# Patient Record
Sex: FEMALE | Race: WHITE | NOT HISPANIC OR LATINO | Employment: OTHER | URBAN - METROPOLITAN AREA
[De-identification: names, ages, dates, MRNs, and addresses within clinical notes are randomized per-mention and may not be internally consistent; named-entity substitution may affect disease eponyms.]

---

## 2017-02-06 ENCOUNTER — TELEPHONE (OUTPATIENT)
Dept: UROLOGY | Facility: CLINIC | Age: 72
End: 2017-02-06

## 2017-02-06 NOTE — TELEPHONE ENCOUNTER
----- Message from Cindy Barrientos sent at 2/6/2017  8:22 AM CST -----  Contact: Annette,[facility]  Annette wants to speak with a nurse regarding patient supply please call back at [phone]

## 2017-02-28 ENCOUNTER — ANTI-COAG VISIT (OUTPATIENT)
Dept: CARDIOLOGY | Facility: CLINIC | Age: 72
End: 2017-02-28

## 2017-02-28 DIAGNOSIS — I48.91 ATRIAL FIBRILLATION, UNSPECIFIED TYPE: ICD-10-CM

## 2017-02-28 DIAGNOSIS — Z79.01 LONG TERM (CURRENT) USE OF ANTICOAGULANTS: ICD-10-CM

## 2017-03-02 ENCOUNTER — ANTI-COAG VISIT (OUTPATIENT)
Dept: CARDIOLOGY | Facility: CLINIC | Age: 72
End: 2017-03-02

## 2017-03-02 DIAGNOSIS — Z79.01 LONG TERM (CURRENT) USE OF ANTICOAGULANTS: Primary | ICD-10-CM

## 2017-03-02 DIAGNOSIS — I48.91 ATRIAL FIBRILLATION, UNSPECIFIED TYPE: ICD-10-CM

## 2017-03-02 LAB — INR PPP: 2

## 2017-03-03 RX ORDER — WARFARIN 2.5 MG/1
TABLET ORAL
Qty: 30 TABLET | Refills: 3 | Status: SHIPPED | OUTPATIENT
Start: 2017-03-03 | End: 2017-03-30 | Stop reason: SDUPTHER

## 2017-03-03 NOTE — PROGRESS NOTES
Orders sent to [facility] and to . helena states a refill on pts coumadin needs to be sent to the medicine cabinet in [place] fax #   [phone]

## 2017-03-09 ENCOUNTER — ANTI-COAG VISIT (OUTPATIENT)
Dept: CARDIOLOGY | Facility: CLINIC | Age: 72
End: 2017-03-09

## 2017-03-09 DIAGNOSIS — Z79.01 LONG TERM (CURRENT) USE OF ANTICOAGULANTS: ICD-10-CM

## 2017-03-09 LAB — INR PPP: 1.4

## 2017-03-20 ENCOUNTER — ANTI-COAG VISIT (OUTPATIENT)
Dept: CARDIOLOGY | Facility: CLINIC | Age: 72
End: 2017-03-20

## 2017-03-20 DIAGNOSIS — Z79.01 LONG TERM (CURRENT) USE OF ANTICOAGULANTS: ICD-10-CM

## 2017-03-20 LAB — INR PPP: 1.3

## 2017-03-27 ENCOUNTER — ANTI-COAG VISIT (OUTPATIENT)
Dept: CARDIOLOGY | Facility: CLINIC | Age: 72
End: 2017-03-27

## 2017-03-27 DIAGNOSIS — Z79.01 LONG TERM (CURRENT) USE OF ANTICOAGULANTS: ICD-10-CM

## 2017-03-27 LAB — INR PPP: 1.2

## 2017-03-29 ENCOUNTER — ANTI-COAG VISIT (OUTPATIENT)
Dept: CARDIOLOGY | Facility: CLINIC | Age: 72
End: 2017-03-29

## 2017-03-29 DIAGNOSIS — Z79.01 LONG TERM (CURRENT) USE OF ANTICOAGULANTS: ICD-10-CM

## 2017-03-29 LAB — INR PPP: 1.9

## 2017-03-30 RX ORDER — WARFARIN 2.5 MG/1
2.5-5 TABLET ORAL DAILY
Qty: 40 TABLET | Refills: 5 | Status: SHIPPED | OUTPATIENT
Start: 2017-03-30 | End: 2017-05-18 | Stop reason: SDUPTHER

## 2017-04-03 LAB — INR PPP: 1.9

## 2017-04-04 ENCOUNTER — ANTI-COAG VISIT (OUTPATIENT)
Dept: CARDIOLOGY | Facility: CLINIC | Age: 72
End: 2017-04-04

## 2017-04-04 DIAGNOSIS — Z79.01 LONG TERM (CURRENT) USE OF ANTICOAGULANTS: ICD-10-CM

## 2017-04-11 ENCOUNTER — ANTI-COAG VISIT (OUTPATIENT)
Dept: CARDIOLOGY | Facility: CLINIC | Age: 72
End: 2017-04-11

## 2017-04-11 DIAGNOSIS — Z79.01 LONG TERM (CURRENT) USE OF ANTICOAGULANTS: ICD-10-CM

## 2017-04-11 LAB — INR PPP: 1.8

## 2017-04-19 ENCOUNTER — ANTI-COAG VISIT (OUTPATIENT)
Dept: CARDIOLOGY | Facility: CLINIC | Age: 72
End: 2017-04-19

## 2017-04-19 DIAGNOSIS — Z79.01 LONG TERM (CURRENT) USE OF ANTICOAGULANTS: ICD-10-CM

## 2017-04-19 LAB — INR PPP: 1.8

## 2017-04-27 DIAGNOSIS — Z95.0 PACEMAKER: Primary | ICD-10-CM

## 2017-04-27 DIAGNOSIS — I48.91 ATRIAL FIBRILLATION, UNSPECIFIED TYPE: ICD-10-CM

## 2017-05-02 ENCOUNTER — ANTI-COAG VISIT (OUTPATIENT)
Dept: CARDIOLOGY | Facility: CLINIC | Age: 72
End: 2017-05-02

## 2017-05-02 DIAGNOSIS — Z79.01 LONG TERM (CURRENT) USE OF ANTICOAGULANTS: ICD-10-CM

## 2017-05-02 LAB — INR PPP: 2.6

## 2017-05-16 ENCOUNTER — ANTI-COAG VISIT (OUTPATIENT)
Dept: CARDIOLOGY | Facility: CLINIC | Age: 72
End: 2017-05-16

## 2017-05-16 DIAGNOSIS — Z79.01 LONG TERM (CURRENT) USE OF ANTICOAGULANTS: ICD-10-CM

## 2017-05-16 LAB — INR PPP: 2.6

## 2017-05-18 DIAGNOSIS — Z79.01 LONG TERM (CURRENT) USE OF ANTICOAGULANTS: ICD-10-CM

## 2017-05-18 RX ORDER — WARFARIN 2.5 MG/1
2.5-5 TABLET ORAL DAILY
Qty: 45 TABLET | Refills: 6 | Status: ON HOLD | OUTPATIENT
Start: 2017-05-18 | End: 2017-10-02 | Stop reason: HOSPADM

## 2017-05-18 NOTE — PROGRESS NOTES
Pt needs refill sent to the medicine cabinet, Dr Romero has already pended it, but his MA brought it to us to refill

## 2017-06-06 ENCOUNTER — ANTI-COAG VISIT (OUTPATIENT)
Dept: CARDIOLOGY | Facility: CLINIC | Age: 72
End: 2017-06-06

## 2017-06-06 LAB — INR PPP: 2

## 2017-06-27 ENCOUNTER — ANTI-COAG VISIT (OUTPATIENT)
Dept: CARDIOLOGY | Facility: CLINIC | Age: 72
End: 2017-06-27

## 2017-06-27 DIAGNOSIS — Z79.01 LONG TERM (CURRENT) USE OF ANTICOAGULANTS: ICD-10-CM

## 2017-06-27 LAB — INR PPP: 3.1

## 2017-07-10 ENCOUNTER — TELEPHONE (OUTPATIENT)
Dept: CARDIOLOGY | Facility: CLINIC | Age: 72
End: 2017-07-10

## 2017-07-10 NOTE — TELEPHONE ENCOUNTER
----- Message from Rani Guillen sent at 7/10/2017 10:26 AM CDT -----  Contact: Patient  Patient is having defibrillations and has since this past weekend. Please call patient [phone] for an appointment and advice.

## 2017-07-12 ENCOUNTER — TELEPHONE (OUTPATIENT)
Dept: CARDIOLOGY | Facility: CLINIC | Age: 72
End: 2017-07-12

## 2017-07-12 NOTE — TELEPHONE ENCOUNTER
----- Message from Melyssa Montenegro sent at 7/12/2017  2:47 PM CDT -----  Contact: Patient   Patient states that she had to re-schedule the appointment she missed and it's for August 7tt.  The patient has questions about the battery in her pacemaker.  Can you please call the patient back at [phone].  Thank you

## 2017-07-18 ENCOUNTER — ANTI-COAG VISIT (OUTPATIENT)
Dept: CARDIOLOGY | Facility: CLINIC | Age: 72
End: 2017-07-18

## 2017-07-18 LAB — INR PPP: 4.2

## 2017-07-19 RX ORDER — ISOSORBIDE MONONITRATE 30 MG/1
30 TABLET, EXTENDED RELEASE ORAL DAILY
Refills: 5 | COMMUNITY
Start: 2017-06-19

## 2017-07-19 RX ORDER — TRAMADOL HYDROCHLORIDE 50 MG/1
TABLET ORAL
Refills: 1 | COMMUNITY
Start: 2017-05-30 | End: 2017-09-26

## 2017-07-19 RX ORDER — DICLOFENAC SODIUM 10 MG/G
2-4 GEL TOPICAL
Refills: 5 | COMMUNITY
Start: 2017-06-19 | End: 2017-09-26

## 2017-07-19 RX ORDER — TRAZODONE HYDROCHLORIDE 100 MG/1
100 TABLET ORAL NIGHTLY
Refills: 3 | COMMUNITY
Start: 2017-06-19

## 2017-07-19 RX ORDER — METFORMIN HYDROCHLORIDE 1000 MG/1
1000 TABLET ORAL 2 TIMES DAILY WITH MEALS
Refills: 3 | COMMUNITY
Start: 2017-06-19 | End: 2017-09-26

## 2017-07-19 RX ORDER — GABAPENTIN 300 MG/1
300 CAPSULE ORAL 3 TIMES DAILY
Refills: 3 | COMMUNITY
Start: 2017-06-19 | End: 2017-10-11

## 2017-07-25 ENCOUNTER — ANTI-COAG VISIT (OUTPATIENT)
Dept: CARDIOLOGY | Facility: CLINIC | Age: 72
End: 2017-07-25

## 2017-07-25 DIAGNOSIS — Z79.01 LONG TERM (CURRENT) USE OF ANTICOAGULANTS: ICD-10-CM

## 2017-07-25 LAB — INR PPP: 5.3

## 2017-07-27 ENCOUNTER — ANTI-COAG VISIT (OUTPATIENT)
Dept: CARDIOLOGY | Facility: CLINIC | Age: 72
End: 2017-07-27

## 2017-07-27 DIAGNOSIS — Z79.01 LONG TERM (CURRENT) USE OF ANTICOAGULANTS: ICD-10-CM

## 2017-07-27 LAB — INR PPP: 3.1

## 2017-08-02 ENCOUNTER — ANTI-COAG VISIT (OUTPATIENT)
Dept: CARDIOLOGY | Facility: CLINIC | Age: 72
End: 2017-08-02

## 2017-08-02 DIAGNOSIS — Z79.01 LONG TERM (CURRENT) USE OF ANTICOAGULANTS: ICD-10-CM

## 2017-08-02 LAB — INR PPP: 3.3

## 2017-08-02 NOTE — PROGRESS NOTES
Faxed to [facility] and [facility] dose also called to [facility].

## 2017-08-10 ENCOUNTER — TELEPHONE (OUTPATIENT)
Dept: HEMATOLOGY/ONCOLOGY | Facility: CLINIC | Age: 72
End: 2017-08-10

## 2017-08-10 ENCOUNTER — ANTI-COAG VISIT (OUTPATIENT)
Dept: CARDIOLOGY | Facility: CLINIC | Age: 72
End: 2017-08-10

## 2017-08-10 DIAGNOSIS — Z79.01 LONG TERM (CURRENT) USE OF ANTICOAGULANTS: ICD-10-CM

## 2017-08-10 LAB — INR PPP: 1.8

## 2017-08-10 NOTE — TELEPHONE ENCOUNTER
----- Message from Edith Muller RT sent at 8/10/2017  1:11 PM CDT -----  Contact: Sofia , Daniel Black , Daniel Manzanares, requesting to inform effective as of yesterday they will no longer be taking care of her medications, please send information to misbah, since the pt is moving, thanks.

## 2017-08-17 ENCOUNTER — ANTI-COAG VISIT (OUTPATIENT)
Dept: CARDIOLOGY | Facility: CLINIC | Age: 72
End: 2017-08-17

## 2017-08-17 ENCOUNTER — TELEPHONE (OUTPATIENT)
Dept: CARDIOLOGY | Facility: CLINIC | Age: 72
End: 2017-08-17

## 2017-08-17 DIAGNOSIS — Z79.01 LONG TERM (CURRENT) USE OF ANTICOAGULANTS: ICD-10-CM

## 2017-08-17 LAB — INR PPP: 1.3 (ref 2–3)

## 2017-08-17 NOTE — TELEPHONE ENCOUNTER
----- Message from Celina Singh PharmD sent at 8/17/2017  3:14 PM CDT -----  Patient wants to call and set up an appointment as soon as possible

## 2017-08-24 ENCOUNTER — ANTI-COAG VISIT (OUTPATIENT)
Dept: CARDIOLOGY | Facility: CLINIC | Age: 72
End: 2017-08-24

## 2017-08-24 DIAGNOSIS — Z79.01 LONG TERM (CURRENT) USE OF ANTICOAGULANTS: ICD-10-CM

## 2017-08-24 LAB — INR PPP: 1.6 (ref 2–3)

## 2017-08-24 NOTE — PROGRESS NOTES
Spoke with alison from  she states she will have nurse go out and set up pts pill box with correct dose of coumadin.  faxed

## 2017-09-08 ENCOUNTER — TELEPHONE (OUTPATIENT)
Dept: CARDIOLOGY | Facility: CLINIC | Age: 72
End: 2017-09-08

## 2017-09-14 ENCOUNTER — ANTI-COAG VISIT (OUTPATIENT)
Dept: CARDIOLOGY | Facility: CLINIC | Age: 72
End: 2017-09-14

## 2017-09-14 DIAGNOSIS — Z79.01 LONG TERM (CURRENT) USE OF ANTICOAGULANTS: ICD-10-CM

## 2017-09-14 LAB — INR PPP: 1.4 (ref 2–3)

## 2017-09-26 PROBLEM — A41.9 SEPSIS AFFECTING SKIN: Status: ACTIVE | Noted: 2017-09-26

## 2017-09-26 PROBLEM — G89.29 CHRONIC PAIN: Chronic | Status: ACTIVE | Noted: 2017-09-26

## 2017-09-27 PROBLEM — L03.115 CELLULITIS OF FOOT, RIGHT: Status: ACTIVE | Noted: 2017-09-27

## 2017-09-27 PROBLEM — E11.621 DIABETIC FOOT ULCER: Status: ACTIVE | Noted: 2017-09-27

## 2017-09-27 PROBLEM — L97.509 DIABETIC FOOT ULCER: Status: ACTIVE | Noted: 2017-09-27

## 2017-09-29 PROBLEM — L03.115 CELLULITIS OF RIGHT LOWER EXTREMITY: Status: ACTIVE | Noted: 2017-09-29

## 2017-10-05 ENCOUNTER — TELEPHONE (OUTPATIENT)
Dept: CARDIOLOGY | Facility: CLINIC | Age: 72
End: 2017-10-05

## 2017-10-05 NOTE — TELEPHONE ENCOUNTER
----- Message from Katarina Dupree sent at 10/5/2017  9:59 AM CDT -----  Contact: Stacey chavez/[facility] [phone]  She would like to confirm that Dr Romero will follow this patient for his home health orders?  Please call her.  Thank you!

## 2017-10-06 ENCOUNTER — ANTI-COAG VISIT (OUTPATIENT)
Dept: CARDIOLOGY | Facility: CLINIC | Age: 72
End: 2017-10-06

## 2017-10-06 LAB — INR PPP: 2.5 (ref 2–3)

## 2017-10-09 ENCOUNTER — TELEPHONE (OUTPATIENT)
Dept: CARDIOLOGY | Facility: CLINIC | Age: 72
End: 2017-10-09

## 2017-10-09 NOTE — TELEPHONE ENCOUNTER
----- Message from Diane Liao sent at 10/6/2017 11:06 AM CDT -----  Contact: [facility] at [phone]  [facility] at [phone], Calling for a hospital follow up appointment for next week. Patient was in [facility] for Cellulitis and tachycardic. Discharged on Monday 10/2/17. Please advise, daughter in Law, Diane [phone]. Thanks.

## 2017-10-11 PROBLEM — S72.001A CLOSED FRACTURE OF RIGHT HIP: Status: ACTIVE | Noted: 2017-10-11

## 2017-10-13 PROBLEM — D62 ACUTE BLOOD LOSS ANEMIA: Status: ACTIVE | Noted: 2017-10-13

## 2017-10-14 PROBLEM — R42 ORTHOSTATIC DIZZINESS: Status: ACTIVE | Noted: 2017-10-14

## 2017-10-18 PROBLEM — R42 ORTHOSTATIC DIZZINESS: Status: ACTIVE | Noted: 2017-10-18

## 2017-10-18 PROBLEM — D62 ACUTE BLOOD LOSS ANEMIA: Status: ACTIVE | Noted: 2017-10-18

## 2017-10-30 ENCOUNTER — ANTI-COAG VISIT (OUTPATIENT)
Dept: CARDIOLOGY | Facility: CLINIC | Age: 72
End: 2017-10-30

## 2017-10-30 DIAGNOSIS — M25.551 RIGHT HIP PAIN: Primary | ICD-10-CM

## 2017-10-30 LAB — INR PPP: 2 (ref 2–3)

## 2017-11-28 ENCOUNTER — ANTI-COAG VISIT (OUTPATIENT)
Dept: CARDIOLOGY | Facility: CLINIC | Age: 72
End: 2017-11-28

## 2019-01-21 ENCOUNTER — OFFICE VISIT (OUTPATIENT)
Dept: UROLOGY | Facility: CLINIC | Age: 74
End: 2019-01-21
Payer: MEDICARE

## 2019-01-21 VITALS
HEIGHT: 69 IN | WEIGHT: 115 LBS | DIASTOLIC BLOOD PRESSURE: 81 MMHG | BODY MASS INDEX: 17.03 KG/M2 | SYSTOLIC BLOOD PRESSURE: 126 MMHG | HEART RATE: 99 BPM

## 2019-01-21 DIAGNOSIS — Z93.6 PRESENCE OF UROSTOMY: Primary | ICD-10-CM

## 2019-01-21 DIAGNOSIS — N30.90 CYSTITIS WITHOUT HEMATURIA: ICD-10-CM

## 2019-01-21 PROCEDURE — 99213 OFFICE O/P EST LOW 20 MIN: CPT | Mod: PBBFAC,PO | Performed by: UROLOGY

## 2019-01-21 PROCEDURE — 99203 OFFICE O/P NEW LOW 30 MIN: CPT | Mod: S$PBB,,, | Performed by: UROLOGY

## 2019-01-21 PROCEDURE — 99999 PR PBB SHADOW E&M-EST. PATIENT-LVL III: ICD-10-PCS | Mod: PBBFAC,,, | Performed by: UROLOGY

## 2019-01-21 PROCEDURE — 99203 PR OFFICE/OUTPT VISIT, NEW, LEVL III, 30-44 MIN: ICD-10-PCS | Mod: S$PBB,,, | Performed by: UROLOGY

## 2019-01-21 PROCEDURE — 99999 PR PBB SHADOW E&M-EST. PATIENT-LVL III: CPT | Mod: PBBFAC,,, | Performed by: UROLOGY

## 2019-01-21 RX ORDER — MEMANTINE HYDROCHLORIDE 5 MG/1
TABLET ORAL
COMMUNITY
Start: 2018-11-20

## 2019-01-21 RX ORDER — MIRTAZAPINE 15 MG/1
TABLET, FILM COATED ORAL
COMMUNITY
Start: 2018-11-20

## 2019-01-21 RX ORDER — PRAVASTATIN SODIUM 10 MG/1
10 TABLET ORAL DAILY
COMMUNITY

## 2019-01-21 RX ORDER — ALPRAZOLAM 0.25 MG/1
0.25 TABLET ORAL 3 TIMES DAILY
COMMUNITY

## 2019-01-21 RX ORDER — PREGABALIN 50 MG/1
CAPSULE ORAL
COMMUNITY
Start: 2018-11-05

## 2019-01-21 RX ORDER — LINACLOTIDE 145 UG/1
CAPSULE, GELATIN COATED ORAL
COMMUNITY
Start: 2018-11-20
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
## 2025-03-27 NOTE — PROGRESS NOTES
Pt discharged from hospital to [facility] for rehab  
hh nurse informed of dosing and next inr chk date; hh faxed  
Renal colic